# Patient Record
Sex: FEMALE | Race: WHITE | NOT HISPANIC OR LATINO | Employment: FULL TIME | URBAN - NONMETROPOLITAN AREA
[De-identification: names, ages, dates, MRNs, and addresses within clinical notes are randomized per-mention and may not be internally consistent; named-entity substitution may affect disease eponyms.]

---

## 2017-11-01 DIAGNOSIS — K22.4 ESOPHAGEAL SPASM: ICD-10-CM

## 2017-11-01 NOTE — TELEPHONE ENCOUNTER
Was the patient seen in the last year in this department? Yes     Does patient have an active prescription for medications requested? No     Received Request Via: Pharmacy      Pt met protocol?: No, OV 11/16   BP Readings from Last 1 Encounters:   11/29/16 120/78

## 2017-11-02 RX ORDER — DILTIAZEM HYDROCHLORIDE 120 MG/1
120 CAPSULE, COATED, EXTENDED RELEASE ORAL DAILY
Qty: 90 CAP | Refills: 0 | Status: SHIPPED | OUTPATIENT
Start: 2017-11-02 | End: 2018-02-13 | Stop reason: SDUPTHER

## 2017-11-29 ENCOUNTER — OFFICE VISIT (OUTPATIENT)
Dept: MEDICAL GROUP | Facility: PHYSICIAN GROUP | Age: 67
End: 2017-11-29
Payer: COMMERCIAL

## 2017-11-29 VITALS
SYSTOLIC BLOOD PRESSURE: 108 MMHG | BODY MASS INDEX: 34.16 KG/M2 | DIASTOLIC BLOOD PRESSURE: 76 MMHG | RESPIRATION RATE: 16 BRPM | TEMPERATURE: 98.4 F | HEIGHT: 65 IN | WEIGHT: 205 LBS | OXYGEN SATURATION: 98 % | HEART RATE: 98 BPM

## 2017-11-29 DIAGNOSIS — M25.561 CHRONIC PAIN OF RIGHT KNEE: ICD-10-CM

## 2017-11-29 DIAGNOSIS — G89.29 CHRONIC PAIN OF RIGHT KNEE: ICD-10-CM

## 2017-11-29 DIAGNOSIS — K21.9 GASTROESOPHAGEAL REFLUX DISEASE WITHOUT ESOPHAGITIS: ICD-10-CM

## 2017-11-29 DIAGNOSIS — M85.80 OSTEOPENIA, UNSPECIFIED LOCATION: ICD-10-CM

## 2017-11-29 PROCEDURE — 99214 OFFICE O/P EST MOD 30 MIN: CPT | Performed by: NURSE PRACTITIONER

## 2017-11-29 ASSESSMENT — PATIENT HEALTH QUESTIONNAIRE - PHQ9: CLINICAL INTERPRETATION OF PHQ2 SCORE: 0

## 2017-11-29 NOTE — LETTER
November 29, 2017        HARVEY Hoover  [address] [place] [address]      To whom it may concern:    Patient is recommended to have Ergonomic Adjustable work station as it is medically necessary for chronic knee pain     If you have any questions or concerns, please don't hesitate to call.        Sincerely,        SUNSHINE Rincon.    Electronically Signed

## 2017-11-29 NOTE — ASSESSMENT & PLAN NOTE
Patient identified as having a small hernia. Patient had Endoscopy. Patient is not on any medications. She managed with diet and portions.

## 2017-11-29 NOTE — ASSESSMENT & PLAN NOTE
Patient has OA of right knee. MRI and saw ortho and was told to take anti-inflammatory. She sits at her work, and requesting letter of support for ergonomic adjustment for work station.  Patient requests referral to ortho to discuss further treatment options.

## 2017-11-29 NOTE — PROGRESS NOTES
Chief Complaint   Patient presents with   • Annual Exam     Rt knee pain, sore, unable to put pressure       HISTORY OF PRESENT ILLNESS: Patient is a @ age 67 here  today to discuss:     Interval history:     Hospitalizations : YES - had Hernia repair and TIF for severe GERD    Injuries : chronic knee pain     Illness  No     Gastroesophageal reflux disease without esophagitis  Patient identified as having a small hernia and GERD identified with Endoscopy. Patient had hernia repair and TIF . She is doing very well.  Patient is not on any medications. She managed with diet and portions.     Osteopenia  Patient is requesting a repeat Bone Density. Patient takes vitamin D and Calcium. Patient is off Actonel for drug holiday rest.     Chronic pain of right knee  Patient has OA of right knee. MRI and saw ortho and was told to take anti-inflammatory. She continues to have problems with her right knee. Very painful.  She sits at her work, and requesting letter of support for ergonomic adjustment for work station.  Patient requests referral to ortho to discuss further treatment options.     Review of Systems   Constitutional: Negative for fever, chills, weight loss and malaise/fatigue.   HENT: Negative for ear pain, nosebleeds, congestion, sore throat and neck pain.    Eyes: Negative for blurred vision.   Respiratory: Negative for cough, sputum production, shortness of breath and wheezing.    Cardiovascular: Negative for chest pain, palpitations, orthopnea and leg swelling.   Gastrointestinal: SMALL HEARTBURN.   Genitourinary: Negative for dysuria, urgency and frequency.   Musculoskeletal:POSITIVE FOR RIGHT KNEE PAIN  Skin: Negative for rash and itching.   Neurological: Negative for dizziness, tingling, tremors, sensory change, focal weakness and headaches.   Endo/Heme/Allergies: Does not bruise/bleed easily.   Psychiatric/Behavioral: Negative for depression, anxiety, or memory loss.       Allergies:Patient has no known  "allergies.    Current Outpatient Prescriptions Ordered in [facility]   Medication Sig Dispense Refill   • diltiazem CD (CARDIZEM CD) 120 MG CAPSULE SR 24 HR Take 1 Cap by mouth every day. 90 Cap 0   • Omega-3 Fatty Acids (OMEGA 3 PO) Take  by mouth.     • Glucosamine-Chondroit-Vit C-Mn (GLUCOSAMINE 1500 COMPLEX PO) Take  by mouth.     • Multiple Vitamin (MULTIVITAMIN PO) Take  by mouth every day.     • Cholecalciferol (VITAMIN D) 2000 UNIT TABS Take 2,500 Units by mouth every day.     • omeprazole (PRILOSEC) 20 MG delayed-release capsule Take 1 Cap by mouth every day. 30 Cap 11   • fluticasone (FLONASE) 50 MCG/ACT nasal spray Spray 1 Spray in nose 2 times a day. Each Nostril 1 Bottle 0   • Pseudoephedrine HCl (SUDAFED PO) Take  by mouth as needed.     • ibuprofen (MOTRIN) 200 MG TABS Take 200 mg by mouth every 6 hours as needed.     • Risedronate Sodium (ACTONEL PO) Take 1.5 mg by mouth every 28 days.       No current [facility]-ordered facility-administered medications on file.        Past Medical History:   Diagnosis Date   • Alcoholism (CMS-[facility])    • OSTEOPOROSIS    • Recurrent sinusitis    • Sleep apnea     moderate.  use dental appliance   • Snoring     \"mild to moderate\"   • UTI (urinary tract infection)     2011       Social History   Substance Use Topics   • Smoking status: Former Smoker     Packs/day: 0.50     Years: 10.00     Types: Cigarettes     Quit date: 1990   • Smokeless tobacco: Never Used      Comment: non smoker   • Alcohol use No      Comment: Sober since ; active member in  in [place]       Family Status   Relation Status   • Mother    • Father      Family History   Problem Relation Age of Onset   • Non-contributory Mother    • Non-contributory Father        ROS: As documented in my HPI      Exam:  Blood pressure 108/76, pulse 98, temperature 36.9 °C (98.4 °F), resp. rate 16, height 1.651 m (5' 5\"), weight 93 kg (205 lb), SpO2 98 %.  General:  Well nourished, well developed " female in NAD  Head: Nontender scalp. No lesions  Neck: Supple. Symmetric Thyroid palpated. No bruits  HEENT: Eyes conjunctiva is clear, lids without ptosis, pupils equal round and reactive to light and accommodation.  Ears normal shape and contour, canals are clear bilaterally, TMs with good light reflex and appear normal.  Nasal mucosa pale and edematous with clear rhinorrhea.  Oropharynx benign.  Sinuses (frontal and maxillary) nontender to palpation.  Pulmonary:  Normal effort. No rales, ronchi, or wheezing.  Cardiovascular: Regular rate and rhythm without murmur.   Abdomen: Soft nontender, normal bowel sounds  Extremities: no clubbing, cyanosis, or edema.  Psych: Alert and oriented x3.  Neurological: No focal deficits    Please note that this dictation was created using voice recognition software. I have made every reasonable attempt to correct obvious errors, but I expect that there are errors of grammar and possibly content that I did not discover before finalizing the note.    Assessment/Plan:  1. Gastroesophageal reflux disease without esophagitis  COMP METABOLIC PANEL    LIPID PROFILE    VITAMIN D,25 HYDROXY    TSH+FREE T4   2. Osteopenia, unspecified location  DS-BONE DENSITY STUDY (DEXA)    COMP METABOLIC PANEL    VITAMIN D,25 HYDROXY   3. Chronic pain of right knee  REFERRAL TO ORTHOPEDICS        Will notify of labs results.

## 2017-11-29 NOTE — ASSESSMENT & PLAN NOTE
Patient is requesting a repeat Bone Density. Patient takes vitamin D and Calcium. Patient is off Actonel.

## 2017-12-26 ENCOUNTER — HOSPITAL ENCOUNTER (OUTPATIENT)
Dept: LAB | Facility: MEDICAL CENTER | Age: 67
End: 2017-12-26
Attending: NURSE PRACTITIONER
Payer: COMMERCIAL

## 2017-12-26 DIAGNOSIS — K21.9 GASTROESOPHAGEAL REFLUX DISEASE WITHOUT ESOPHAGITIS: ICD-10-CM

## 2017-12-26 DIAGNOSIS — M85.80 OSTEOPENIA, UNSPECIFIED LOCATION: ICD-10-CM

## 2017-12-26 LAB
25(OH)D3 SERPL-MCNC: 37 NG/ML (ref 30–100)
ALBUMIN SERPL BCP-MCNC: 3.9 G/DL (ref 3.2–4.9)
ALBUMIN/GLOB SERPL: 1.4 G/DL
ALP SERPL-CCNC: 69 U/L (ref 30–99)
ALT SERPL-CCNC: 19 U/L (ref 2–50)
ANION GAP SERPL CALC-SCNC: 7 MMOL/L (ref 0–11.9)
AST SERPL-CCNC: 20 U/L (ref 12–45)
BILIRUB SERPL-MCNC: 0.7 MG/DL (ref 0.1–1.5)
BUN SERPL-MCNC: 13 MG/DL (ref 8–22)
CALCIUM SERPL-MCNC: 9.1 MG/DL (ref 8.5–10.5)
CHLORIDE SERPL-SCNC: 109 MMOL/L (ref 96–112)
CHOLEST SERPL-MCNC: 210 MG/DL (ref 100–199)
CO2 SERPL-SCNC: 26 MMOL/L (ref 20–33)
CREAT SERPL-MCNC: 1.02 MG/DL (ref 0.5–1.4)
GFR SERPL CREATININE-BSD FRML MDRD: 54 ML/MIN/1.73 M 2
GLOBULIN SER CALC-MCNC: 2.7 G/DL (ref 1.9–3.5)
GLUCOSE SERPL-MCNC: 94 MG/DL (ref 65–99)
HDLC SERPL-MCNC: 65 MG/DL
LDLC SERPL CALC-MCNC: 128 MG/DL
POTASSIUM SERPL-SCNC: 4.1 MMOL/L (ref 3.6–5.5)
PROT SERPL-MCNC: 6.6 G/DL (ref 6–8.2)
SODIUM SERPL-SCNC: 142 MMOL/L (ref 135–145)
T4 FREE SERPL-MCNC: 0.83 NG/DL (ref 0.53–1.43)
TRIGL SERPL-MCNC: 87 MG/DL (ref 0–149)
TSH SERPL DL<=0.005 MIU/L-ACNC: 3.47 UIU/ML (ref 0.38–5.33)

## 2017-12-26 PROCEDURE — 80061 LIPID PANEL: CPT

## 2017-12-26 PROCEDURE — 80053 COMPREHEN METABOLIC PANEL: CPT

## 2017-12-26 PROCEDURE — 36415 COLL VENOUS BLD VENIPUNCTURE: CPT

## 2017-12-26 PROCEDURE — 84443 ASSAY THYROID STIM HORMONE: CPT

## 2017-12-26 PROCEDURE — 82306 VITAMIN D 25 HYDROXY: CPT

## 2017-12-26 PROCEDURE — 84439 ASSAY OF FREE THYROXINE: CPT

## 2018-01-17 ENCOUNTER — OFFICE VISIT (OUTPATIENT)
Dept: URGENT CARE | Facility: PHYSICIAN GROUP | Age: 68
End: 2018-01-17
Payer: COMMERCIAL

## 2018-01-17 VITALS
HEIGHT: 65 IN | SYSTOLIC BLOOD PRESSURE: 124 MMHG | DIASTOLIC BLOOD PRESSURE: 80 MMHG | TEMPERATURE: 98.7 F | WEIGHT: 207 LBS | RESPIRATION RATE: 16 BRPM | OXYGEN SATURATION: 94 % | HEART RATE: 80 BPM | BODY MASS INDEX: 34.49 KG/M2

## 2018-01-17 DIAGNOSIS — J22 ACUTE RESPIRATORY INFECTION: ICD-10-CM

## 2018-01-17 DIAGNOSIS — E66.9 OBESITY (BMI 30-39.9): ICD-10-CM

## 2018-01-17 PROCEDURE — 99212 OFFICE O/P EST SF 10 MIN: CPT | Performed by: FAMILY MEDICINE

## 2018-01-17 RX ORDER — AMOXICILLIN AND CLAVULANATE POTASSIUM 875; 125 MG/1; MG/1
TABLET, FILM COATED ORAL
Qty: 20 TAB | Refills: 0 | Status: CANCELLED | OUTPATIENT
Start: 2018-01-17

## 2018-01-17 NOTE — LETTER
January 17, 2018         Patient: HARVEY Hoover   YOB: 1950   Date of Visit: 1/17/2018           To Whom it May Concern:    HARVEY Hoover was seen in my clinic on 1/17/2018.    Please excuse from work for 1/15 thru and including 1/17/18 due to medical condition.    May return to regular work duty on 1/18/18.    If you have any questions or concerns, please don't hesitate to call.        Sincerely,           Kirill Hurley M.D.  Electronically Signed

## 2018-01-18 NOTE — PROGRESS NOTES
"Chief Complaint:    Chief Complaint   Patient presents with   • Nasal Congestion       History of Present Illness:    This is a new problem. On 1/13/18, she started with URI type of symptoms. Everything is getting better, nothing is getting worse, but now needs work note. If she did not need work note, she would not be here today. Only mild nasal symptoms and mild cough remain. No fever in past 2 days.      Review of Systems:    Constitutional: Negative for fever, chills, and diaphoresis.   Eyes: Negative for change in vision, photophobia, pain, redness, and discharge.  ENT: See HPI.  Respiratory: See HPI.  Cardiovascular: Negative for chest pain, palpitations, orthopnea, claudication, leg swelling, and PND.   Gastrointestinal: Negative for abdominal pain, nausea, vomiting, diarrhea, constipation, blood in stool, and melena.   Genitourinary: Negative for dysuria, urinary urgency, urinary frequency, hematuria, and flank pain.   Musculoskeletal: Negative for myalgias, joint pain, neck pain, and back pain.   Skin: Negative for rash and itching.   Neurological: Negative for dizziness, tingling, tremors, sensory change, speech change, focal weakness, seizures, loss of consciousness, and headaches.   Endo: Negative for polydipsia.   Heme: Does not bruise/bleed easily.   Psychiatric/Behavioral: No new symptoms.    Past Medical History:    Past Medical History:   Diagnosis Date   • Alcoholism (CMS-HCC)    • OSTEOPOROSIS    • Recurrent sinusitis    • Sleep apnea     moderate.  use dental appliance   • Snoring     \"mild to moderate\"   • UTI (urinary tract infection)     9/2011       Past Surgical History:    Past Surgical History:   Procedure Laterality Date   • HYSTERECTOMY ROBOTIC  2/23/2012    Performed by ALEXY ANDRADE at SURGERY [facility]S   • BIOPSY GENERAL  2/23/2012    Performed by ALEXY ANDRADE at SURGERY [facility]   • ABDOMINAL HYSTERECTOMY TOTAL      total    • CHOLECYSTECTOMY     • DILATION AND CURETTAGE  " "   • SINUSOTOMIES         Social History:    Social History     Social History   • Marital status:      Spouse name: N/A   • Number of children: N/A   • Years of education: N/A     Occupational History   • Not on file.     Social History Main Topics   • Smoking status: Former Smoker     Packs/day: 0.50     Years: 10.00     Types: Cigarettes     Quit date: 1/1/1990   • Smokeless tobacco: Never Used      Comment: non smoker   • Alcohol use No      Comment: Sober since 1993; active member in  in [place]   • Drug use: No   • Sexual activity: Yes     Partners: Male     Other Topics Concern   • Not on file     Social History Narrative   • No narrative on file       Family History:    Family History   Problem Relation Age of Onset   • Non-contributory Mother    • Non-contributory Father        Medications:    Current Outpatient Prescriptions on File Prior to Visit   Medication Sig Dispense Refill   • diltiazem CD (CARDIZEM CD) 120 MG CAPSULE SR 24 HR Take 1 Cap by mouth every day. 90 Cap 0   • omeprazole (PRILOSEC) 20 MG delayed-release capsule Take 1 Cap by mouth every day. 30 Cap 11   • Omega-3 Fatty Acids (OMEGA 3 PO) Take  by mouth.     • Glucosamine-Chondroit-Vit C-Mn (GLUCOSAMINE 1500 COMPLEX PO) Take  by mouth.     • fluticasone (FLONASE) 50 MCG/ACT nasal spray Spray 1 Spray in nose 2 times a day. Each Nostril 1 Bottle 0   • Multiple Vitamin (MULTIVITAMIN PO) Take  by mouth every day.     • Cholecalciferol (VITAMIN D) 2000 UNIT TABS Take 2,500 Units by mouth every day.     • Risedronate Sodium (ACTONEL PO) Take 1.5 mg by mouth every 28 days.       No current facility-administered medications on file prior to visit.        Allergies:    No Known Allergies      Vitals:    Vitals:    01/17/18 1633   BP: 124/80   Pulse: 80   Resp: 16   Temp: 37.1 °C (98.7 °F)   SpO2: 94%   Weight: 93.9 kg (207 lb)   Height: 1.651 m (5' 5\")       Physical Exam:    Constitutional: Vital signs reviewed. Appears well-developed and " well-nourished. No acute distress.   Eyes: Sclera white, conjunctivae clear.   ENT: External ears normal. Hearing normal. Nasal mucosa pink. Lips/teeth are normal. Oral mucosa pink and moist. Posterior pharynx: WNL.  Neck: Neck supple.   Cardiovascular: Regular rate and rhythm. No murmur.  Pulmonary/Chest: Respirations non-labored. Clear to auscultation bilaterally.  Lymph: Cervical nodes without tenderness or enlargement.      Assessment / Plan:    1. Acute respiratory infection      Work note given - excuse for 1/15 thru and including 1/17/18. May return to regular work duty on 1/18/18.    Discussed with her DDX and management options.    Follow-up with PCP or urgent care if getting worse or not better with time.

## 2018-02-13 DIAGNOSIS — K22.4 ESOPHAGEAL SPASM: ICD-10-CM

## 2018-02-13 NOTE — TELEPHONE ENCOUNTER
Was the patient seen in the last year in this department? Yes     Does patient have an active prescription for medications requested? No     Received Request Via: Pharmacy    Pt met protocol?: Yes     Last OV 11/2017  BP Readings from Last 1 Encounters:   01/17/18 124/80

## 2018-02-14 RX ORDER — DILTIAZEM HYDROCHLORIDE 120 MG/1
CAPSULE, EXTENDED RELEASE ORAL
Refills: 0 | OUTPATIENT
Start: 2018-02-14

## 2018-02-14 RX ORDER — DILTIAZEM HYDROCHLORIDE 120 MG/1
CAPSULE, EXTENDED RELEASE ORAL
Qty: 90 CAP | Refills: 1 | Status: SHIPPED | OUTPATIENT
Start: 2018-02-14 | End: 2019-02-15

## 2018-02-14 NOTE — TELEPHONE ENCOUNTER
Refill X 6 months, sent to pharmacy.Pt. Seen in the last 6 months per protocol.   Lab Results   Component Value Date/Time    SODIUM 142 12/26/2017 07:07 AM    POTASSIUM 4.1 12/26/2017 07:07 AM    CHLORIDE 109 12/26/2017 07:07 AM    CO2 26 12/26/2017 07:07 AM    GLUCOSE 94 12/26/2017 07:07 AM    BUN 13 12/26/2017 07:07 AM    CREATININE 1.02 12/26/2017 07:07 AM

## 2018-05-04 ENCOUNTER — OFFICE VISIT (OUTPATIENT)
Dept: URGENT CARE | Facility: PHYSICIAN GROUP | Age: 68
End: 2018-05-04
Payer: COMMERCIAL

## 2018-05-04 VITALS
HEIGHT: 65 IN | BODY MASS INDEX: 30.99 KG/M2 | DIASTOLIC BLOOD PRESSURE: 82 MMHG | RESPIRATION RATE: 16 BRPM | TEMPERATURE: 97.5 F | SYSTOLIC BLOOD PRESSURE: 124 MMHG | OXYGEN SATURATION: 96 % | HEART RATE: 74 BPM | WEIGHT: 186 LBS

## 2018-05-04 DIAGNOSIS — H69.93 ETD (EUSTACHIAN TUBE DYSFUNCTION), BILATERAL: ICD-10-CM

## 2018-05-04 DIAGNOSIS — J01.00 ACUTE MAXILLARY SINUSITIS, RECURRENCE NOT SPECIFIED: ICD-10-CM

## 2018-05-04 PROCEDURE — 99213 OFFICE O/P EST LOW 20 MIN: CPT | Performed by: NURSE PRACTITIONER

## 2018-05-04 RX ORDER — ONDANSETRON 4 MG/1
4 TABLET, ORALLY DISINTEGRATING ORAL ONCE
Status: COMPLETED | OUTPATIENT
Start: 2018-05-04 | End: 2018-05-04

## 2018-05-04 RX ADMIN — ONDANSETRON 4 MG: 4 TABLET, ORALLY DISINTEGRATING ORAL at 10:48

## 2018-05-04 ASSESSMENT — ENCOUNTER SYMPTOMS
DIZZINESS: 1
GASTROINTESTINAL NEGATIVE: 1
EYES NEGATIVE: 1
SORE THROAT: 0
SINUS PAIN: 1
RESPIRATORY NEGATIVE: 1
CARDIOVASCULAR NEGATIVE: 1
CONSTITUTIONAL NEGATIVE: 1

## 2018-05-04 NOTE — PROGRESS NOTES
Subjective:      H Anna Hoover is a 68 y.o. female who presents with Sinus Problem (Earache )            HPI   Pt c/o sinus congestion and earaches  Also c/o dizziness when moves head  Hx of sinus issues  Hx of sinus surgery  Used saline rinse and humidifier  No ST    clear nasal discharge  B/l facial tenderness more in right side    Mild ear pain  No fever  No nausea no vomiting  No falls  No teeth pain  Drinking enough  water    PMH:  has a past medical history of Alcoholism (CMS-[facility]) (HCC); OSTEOPOROSIS; Recurrent sinusitis; Sleep apnea; Snoring; and UTI (urinary tract infection). She also has no past medical history of Diabetes.  MEDS:   Current Outpatient Prescriptions:   •  MethylPREDNISolone (MEDROL DOSEPAK) 4 MG Tablet Therapy Pack, Take 1 Tab by mouth every day., Disp: 1 Kit, Rfl: 0  •  CARTIA  MG CAPSULE SR 24 HR, TAKE 1 CAPSULE BY MOUTH EVERY DAY, Disp: 90 Cap, Rfl: 1  •  omeprazole (PRILOSEC) 20 MG delayed-release capsule, Take 1 Cap by mouth every day., Disp: 30 Cap, Rfl: 11  •  Omega-3 Fatty Acids (OMEGA 3 PO), Take  by mouth., Disp: , Rfl:   •  Glucosamine-Chondroit-Vit C-Mn (GLUCOSAMINE 1500 COMPLEX PO), Take  by mouth., Disp: , Rfl:   •  fluticasone (FLONASE) 50 MCG/ACT nasal spray, Spray 1 Spray in nose 2 times a day. Each Nostril, Disp: 1 Bottle, Rfl: 0  •  Multiple Vitamin (MULTIVITAMIN PO), Take  by mouth every day., Disp: , Rfl:   •  Cholecalciferol (VITAMIN D) 2000 UNIT TABS, Take 2,500 Units by mouth every day., Disp: , Rfl:   •  Risedronate Sodium (ACTONEL PO), Take 1.5 mg by mouth every 28 days., Disp: , Rfl:   ALLERGIES: No Known Allergies  SURGHX:   Past Surgical History:   Procedure Laterality Date   • HYSTERECTOMY ROBOTIC  2/23/2012    Performed by ALEXY ANDRADE at SURGERY [facility]S   • BIOPSY GENERAL  2/23/2012    Performed by ALEXY ANDRADE at SURGERY [facility] ORS   • ABDOMINAL HYSTERECTOMY TOTAL      total    • CHOLECYSTECTOMY     • DILATION AND CURETTAGE     •  "SINUSOTOMIES       SOCHX:  reports that she quit smoking about 28 years ago. Her smoking use included Cigarettes. She has a 5.00 pack-year smoking history. She has never used smokeless tobacco. She reports that she does not drink alcohol or use drugs.  FH: family history includes Non-contributory in her father and mother.    Review of Systems   Constitutional: Negative.    HENT: Positive for congestion, ear pain and sinus pain. Negative for ear discharge and sore throat.    Eyes: Negative.    Respiratory: Negative.    Cardiovascular: Negative.    Gastrointestinal: Negative.    Genitourinary: Negative.    Skin: Negative.    Neurological: Positive for dizziness.          Objective:     /82   Pulse 74   Temp 36.4 °C (97.5 °F)   Resp 16   Ht 1.651 m (5' 5\")   Wt 84.4 kg (186 lb)   SpO2 96%   BMI 30.95 kg/m²      Physical Exam   Constitutional: She is oriented to person, place, and time. She appears well-developed and well-nourished.   HENT:   Head: Normocephalic and atraumatic.   Eyes: Conjunctivae and EOM are normal. Pupils are equal, round, and reactive to light.   Neck: Normal range of motion. Neck supple.   Cardiovascular: Normal rate, regular rhythm and normal heart sounds.    Pulmonary/Chest: Effort normal.   Musculoskeletal: Normal range of motion.   Neurological: She is alert and oriented to person, place, and time.   Skin: Skin is warm and dry. Capillary refill takes less than 2 seconds.   Psychiatric: She has a normal mood and affect. Her behavior is normal. Judgment and thought content normal.               Assessment/Plan:     1. ETD (Eustachian tube dysfunction), bilateral     2. Acute maxillary sinusitis, recurrence not specified  MethylPREDNISolone (MEDROL DOSEPAK) 4 MG Tablet Therapy Pack     Handout given to patient on both eustachian tube dysfunction and sinusitis  Start with saline nasal rinse and nasal steroids such as Flonase  Can add combo decongestant antihistamine  Start Medrol " Dosepak  If sinus symptoms fail to improve patient will then need an antibiotic  Discussed holding off on antibiotics today patient is in agreement  Monitor for worsening persistent symptoms and follow-up sooner

## 2018-05-04 NOTE — PATIENT INSTRUCTIONS
Sinusitis, Adult  Sinusitis is soreness and inflammation of your sinuses. Sinuses are hollow spaces in the bones around your face. They are located:  · Around your eyes.  · In the middle of your forehead.  · Behind your nose.  · In your cheekbones.  Your sinuses and nasal passages are lined with a stringy fluid (mucus). Mucus normally drains out of your sinuses. When your nasal tissues get inflamed or swollen, the mucus can get trapped or blocked so air cannot flow through your sinuses. This lets bacteria, viruses, and funguses grow, and that leads to infection.  Follow these instructions at home:  Medicines  · Take, use, or apply over-the-counter and prescription medicines only as told by your doctor. These may include nasal sprays.  · If you were prescribed an antibiotic medicine, take it as told by your doctor. Do not stop taking the antibiotic even if you start to feel better.  Hydrate and Humidify  · Drink enough water to keep your pee (urine) clear or pale yellow.  · Use a cool mist humidifier to keep the humidity level in your home above 50%.  · Breathe in steam for 10-15 minutes, 3-4 times a day or as told by your doctor. You can do this in the bathroom while a hot shower is running.  · Try not to spend time in cool or dry air.  Rest  · Rest as much as possible.  · Sleep with your head raised (elevated).  · Make sure to get enough sleep each night.  General instructions  · Put a warm, moist washcloth on your face 3-4 times a day or as told by your doctor. This will help with discomfort.  · Wash your hands often with soap and water. If there is no soap and water, use hand .  · Do not smoke. Avoid being around people who are smoking (secondhand smoke).  · Keep all follow-up visits as told by your doctor. This is important.  Contact a doctor if:  · You have a fever.  · Your symptoms get worse.  · Your symptoms do not get better within 10 days.  Get help right away if:  · You have a very bad  headache.  · You cannot stop throwing up (vomiting).  · You have pain or swelling around your face or eyes.  · You have trouble seeing.  · You feel confused.  · Your neck is stiff.  · You have trouble breathing.  This information is not intended to replace advice given to you by your health care provider. Make sure you discuss any questions you have with your health care provider.  Document Released: 06/05/2009 Document Revised: 08/13/2017 Document Reviewed: 10/12/2016  [employer] Interactive Patient Education © 2017 [employer]      Eustachian Tube Dysfunction  Introduction  The eustachian tube connects the middle ear to the back of the nose. It regulates air pressure in the middle ear by allowing air to move between the ear and nose. It also helps to drain fluid from the middle ear space. When the eustachian tube does not function properly, air pressure, fluid, or both can build up in the middle ear.  Eustachian tube dysfunction can affect one or both ears.  What are the causes?  This condition happens when the eustachian tube becomes blocked or cannot open normally. This may result from:  · Ear infections.  · Colds and other upper respiratory infections.  · Allergies.  · Irritation, such as from cigarette smoke or acid from the stomach coming up into the esophagus (gastroesophageal reflux).  · Sudden changes in air pressure, such as from descending in an airplane.  · Abnormal growths in the nose or throat, such as nasal polyps, tumors, or enlarged tissue at the back of the throat (adenoids).  What increases the risk?  This condition may be more likely to develop in people who smoke and people who are overweight. Eustachian tube dysfunction may also be more likely to develop in children, especially children who have:  · Certain birth defects of the mouth, such as cleft palate.  · Large tonsils and adenoids.  What are the signs or symptoms?  Symptoms of this condition may include:  · A feeling of fullness in the  "ear.  · Ear pain.  · Clicking or popping noises in the ear.  · Ringing in the ear.  · Hearing loss.  · Loss of balance.  Symptoms may get worse when the air pressure around you changes, such as when you travel to an area of high elevation or fly on an airplane.  How is this diagnosed?  This condition may be diagnosed based on:  · Your symptoms.  · A physical exam of your ear, nose, and throat.  · Tests, such as those that measure:  ¨ The movement of your eardrum (tympanogram).  ¨ Your hearing (audiometry).  How is this treated?  Treatment depends on the cause and severity of your condition. If your symptoms are mild, you may be able to relieve your symptoms by moving air into (\"popping\") your ears. If you have symptoms of fluid in your ears, treatment may include:  · Decongestants.  · Antihistamines.  · Nasal sprays or ear drops that contain medicines that reduce swelling (steroids).  In some cases, you may need to have a procedure to drain the fluid in your eardrum (myringotomy). In this procedure, a small tube is placed in the eardrum to:  · Drain the fluid.  · Restore the air in the middle ear space.  Follow these instructions at home:  · Take over-the-counter and prescription medicines only as told by your health care provider.  · Use techniques to help pop your ears as recommended by your health care provider. These may include:  ¨ Chewing gum.  ¨ Yawning.  ¨ Frequent, forceful swallowing.  ¨ Closing your mouth, holding your nose closed, and gently blowing as if you are trying to blow air out of your nose.  · Do not do any of the following until your health care provider approves:  ¨ Travel to high altitudes.  ¨ Fly in airplanes.  ¨ Work in a pressurized cabin or room.  ¨ Scuba dive.  · Keep your ears dry. Dry your ears completely after showering or bathing.  · Do not smoke.  · Keep all follow-up visits as told by your health care provider. This is important.  Contact a health care provider if:  · Your symptoms " do not go away after treatment.  · Your symptoms come back after treatment.  · You are unable to pop your ears.  · You have:  ¨ A fever.  ¨ Pain in your ear.  ¨ Pain in your head or neck.  ¨ Fluid draining from your ear.  · Your hearing suddenly changes.  · You become very dizzy.  · You lose your balance.  This information is not intended to replace advice given to you by your health care provider. Make sure you discuss any questions you have with your health care provider.  Document Released: 01/13/2017 Document Revised: 05/25/2017 Document Reviewed: 01/06/2016  © 2017 [employer]

## 2018-05-04 NOTE — NON-PROVIDER
Medication (Zofran 4mg ODT) and Lab (POCT Urinalysis) were ordered and documented on this patient incorrectly.  This medication was not administered and a urine specimen was not collected on this patient.  Provider aware of situation and was unable to reverse/cancel orders.  IT contacted to have corrections made.  Practice supervisor notified of situation.

## 2018-11-07 NOTE — PROGRESS NOTES
Order(s) created erroneously. Erroneous order ID: [id number]   Order moved by: CLARENCE RETANA   Order move date/time: 11/07/2018 11:41 AM   Source Patient: [id number]   Source Contact: 05/04/2018   Destination Patient: [id number]   Destination Contact: 02/08/2016

## 2019-02-15 ENCOUNTER — OFFICE VISIT (OUTPATIENT)
Dept: URGENT CARE | Facility: PHYSICIAN GROUP | Age: 69
End: 2019-02-15
Payer: COMMERCIAL

## 2019-02-15 ENCOUNTER — HOSPITAL ENCOUNTER (OUTPATIENT)
Facility: MEDICAL CENTER | Age: 69
End: 2019-02-15
Attending: PHYSICIAN ASSISTANT
Payer: COMMERCIAL

## 2019-02-15 VITALS
TEMPERATURE: 97.5 F | SYSTOLIC BLOOD PRESSURE: 106 MMHG | BODY MASS INDEX: 30.45 KG/M2 | DIASTOLIC BLOOD PRESSURE: 68 MMHG | RESPIRATION RATE: 16 BRPM | OXYGEN SATURATION: 97 % | WEIGHT: 183 LBS | HEART RATE: 65 BPM

## 2019-02-15 DIAGNOSIS — R35.0 URINE FREQUENCY: ICD-10-CM

## 2019-02-15 DIAGNOSIS — N30.01 ACUTE CYSTITIS WITH HEMATURIA: ICD-10-CM

## 2019-02-15 DIAGNOSIS — N30.01 ACUTE CYSTITIS WITH HEMATURIA: Primary | ICD-10-CM

## 2019-02-15 LAB
APPEARANCE UR: CLEAR
BILIRUB UR STRIP-MCNC: NORMAL MG/DL
COLOR UR AUTO: YELLOW
GLUCOSE UR STRIP.AUTO-MCNC: NORMAL MG/DL
KETONES UR STRIP.AUTO-MCNC: 15 MG/DL
LEUKOCYTE ESTERASE UR QL STRIP.AUTO: NORMAL
NITRITE UR QL STRIP.AUTO: NORMAL
PH UR STRIP.AUTO: 6 [PH] (ref 5–8)
PROT UR QL STRIP: NORMAL MG/DL
RBC UR QL AUTO: NORMAL
SP GR UR STRIP.AUTO: <1.005
UROBILINOGEN UR STRIP-MCNC: 0.2 MG/DL

## 2019-02-15 PROCEDURE — 99214 OFFICE O/P EST MOD 30 MIN: CPT | Performed by: PHYSICIAN ASSISTANT

## 2019-02-15 PROCEDURE — 81002 URINALYSIS NONAUTO W/O SCOPE: CPT | Performed by: PHYSICIAN ASSISTANT

## 2019-02-15 PROCEDURE — 87086 URINE CULTURE/COLONY COUNT: CPT

## 2019-02-15 PROCEDURE — 87077 CULTURE AEROBIC IDENTIFY: CPT

## 2019-02-15 PROCEDURE — 87186 SC STD MICRODIL/AGAR DIL: CPT

## 2019-02-15 RX ORDER — NITROFURANTOIN 25; 75 MG/1; MG/1
100 CAPSULE ORAL EVERY 12 HOURS
Qty: 10 CAP | Refills: 0 | Status: SHIPPED | OUTPATIENT
Start: 2019-02-15 | End: 2019-02-20

## 2019-02-15 ASSESSMENT — ENCOUNTER SYMPTOMS
NAUSEA: 0
COUGH: 0
CONSTIPATION: 0
CHILLS: 0
DIARRHEA: 0
SHORTNESS OF BREATH: 0
FLANK PAIN: 0
ABDOMINAL PAIN: 0
FEVER: 0
VOMITING: 0

## 2019-02-15 NOTE — PROGRESS NOTES
Subjective:   HARVEY Hoover is a 68 y.o. female who presents for Urinary Frequency (x 2 days )          Dysuria    This is a new problem. Episode onset: 2 days. The problem has been unchanged. The quality of the pain is described as burning. There has been no fever. There is no history of pyelonephritis. Associated symptoms include frequency, hematuria, hesitancy and urgency. Pertinent negatives include no chills, discharge, flank pain, nausea or vomiting. Her past medical history is significant for recurrent UTIs. There is no history of kidney stones.       Review of Systems   Constitutional: Negative for chills, fever and malaise/fatigue.   Respiratory: Negative for cough and shortness of breath.    Gastrointestinal: Negative for abdominal pain, constipation, diarrhea, nausea and vomiting.   Genitourinary: Positive for dysuria, frequency, hematuria, hesitancy and urgency. Negative for flank pain.   All other systems reviewed and are negative.      PMH:  has a past medical history of Alcoholism (HCC); OSTEOPOROSIS; Recurrent sinusitis; Sleep apnea; Snoring; and UTI (urinary tract infection). She also has no past medical history of Diabetes.  MEDS:   Current Outpatient Prescriptions:   •  nitrofurantoin monohydr macro (MACROBID) 100 MG Cap, Take 1 Cap by mouth every 12 hours for 5 days., Disp: 10 Cap, Rfl: 0  •  Omega-3 Fatty Acids (OMEGA 3 PO), Take  by mouth., Disp: , Rfl:   •  Glucosamine-Chondroit-Vit C-Mn (GLUCOSAMINE 1500 COMPLEX PO), Take  by mouth., Disp: , Rfl:   •  Multiple Vitamin (MULTIVITAMIN PO), Take  by mouth every day., Disp: , Rfl:   •  Cholecalciferol (VITAMIN D) 2000 UNIT TABS, Take 2,500 Units by mouth every day., Disp: , Rfl:   •  Risedronate Sodium (ACTONEL PO), Take 1.5 mg by mouth every 28 days., Disp: , Rfl:   ALLERGIES: No Known Allergies  SURGHX:   Past Surgical History:   Procedure Laterality Date   • HYSTERECTOMY ROBOTIC  2/23/2012    Performed by ALEXY ANDRADE at [facility]  ORS   • BIOPSY GENERAL  2/23/2012    Performed by ALEXY ANDRADE at [facility] ORS   • ABDOMINAL HYSTERECTOMY TOTAL      total    • CHOLECYSTECTOMY     • DILATION AND CURETTAGE     • SINUSOTOMIES       SOCHX:  reports that she quit smoking about 29 years ago. Her smoking use included Cigarettes. She has a 5.00 pack-year smoking history. She has never used smokeless tobacco. She reports that she does not drink alcohol or use drugs.  Family History   Problem Relation Age of Onset   • Non-contributory Mother    • Non-contributory Father         Objective:   /68   Pulse 65   Temp 36.4 °C (97.5 °F) (Temporal)   Resp 16   Wt 83 kg (183 lb)   SpO2 97%   BMI 30.45 kg/m²     Physical Exam   Constitutional: She is oriented to person, place, and time. She appears well-developed and well-nourished. No distress.   HENT:   Head: Normocephalic and atraumatic.   Eyes: Pupils are equal, round, and reactive to light. Conjunctivae are normal.   Cardiovascular: Normal rate and regular rhythm.    Pulmonary/Chest: Effort normal and breath sounds normal.   Abdominal: There is no tenderness. There is no CVA tenderness.   Neurological: She is alert and oriented to person, place, and time.   Skin: Skin is warm and dry.   Psychiatric: She has a normal mood and affect. Her behavior is normal.   Vitals reviewed.        Lab Results   Component Value Date/Time    POCCOLOR Yellow 02/15/2019 12:12 PM    POCAPPEAR clear 02/15/2019 12:12 PM    POCLEUKEST Moderate 02/15/2019 12:12 PM    POCNITRITE NEg 02/15/2019 12:12 PM    POCUROBILIGE 0.2 02/15/2019 12:12 PM    POCPROTEIN Trace 02/15/2019 12:12 PM    POCURPH 6 02/15/2019 12:12 PM    POCBLOOD Large 02/15/2019 12:12 PM    POCSPGRV <1.005 02/15/2019 12:12 PM    POCKETONES 15 02/15/2019 12:12 PM    POCBILIRUBIN neg 02/15/2019 12:12 PM    POCGLUCUA neg 02/15/2019 12:12 PM      Assessment/Plan:     1. Acute cystitis with hematuria  Urine Culture    nitrofurantoin monohydr macro  (MACROBID) 100 MG Cap   2. Urine frequency  POCT Urinalysis       Pt will be notified if final urine cx results are not susceptible to antibiotic prescribed today.     Patient directed to take full course of abx. Supportive care measures reviewed, continue pushing fluids, probiotics/yogurt. UTI educational handout given to patient. If sx worsen or persist patient directed to return to clinic for reevaluation.     Follow-up with primary care provider within 7-10 days, red flags and emergency room precautions discussed.  Patient appears understanding of information.

## 2019-02-17 LAB
BACTERIA UR CULT: ABNORMAL
BACTERIA UR CULT: ABNORMAL
SIGNIFICANT IND 70042: ABNORMAL
SITE SITE: ABNORMAL
SOURCE SOURCE: ABNORMAL

## 2019-02-18 DIAGNOSIS — N30.01 ACUTE CYSTITIS WITH HEMATURIA: Primary | ICD-10-CM

## 2019-02-18 RX ORDER — CEFUROXIME AXETIL 250 MG/1
250 TABLET ORAL 2 TIMES DAILY
Qty: 14 TAB | Refills: 0 | Status: SHIPPED | OUTPATIENT
Start: 2019-02-18 | End: 2019-02-25

## 2019-09-20 ENCOUNTER — OFFICE VISIT (OUTPATIENT)
Dept: MEDICAL GROUP | Facility: CLINIC | Age: 69
End: 2019-09-20
Payer: COMMERCIAL

## 2019-09-20 ENCOUNTER — PATIENT MESSAGE (OUTPATIENT)
Dept: MEDICAL GROUP | Facility: CLINIC | Age: 69
End: 2019-09-20

## 2019-09-20 VITALS
BODY MASS INDEX: 28.16 KG/M2 | HEIGHT: 65 IN | HEART RATE: 68 BPM | RESPIRATION RATE: 16 BRPM | WEIGHT: 169 LBS | SYSTOLIC BLOOD PRESSURE: 112 MMHG | TEMPERATURE: 98.9 F | OXYGEN SATURATION: 97 % | DIASTOLIC BLOOD PRESSURE: 72 MMHG

## 2019-09-20 DIAGNOSIS — M85.80 OSTEOPENIA, UNSPECIFIED LOCATION: ICD-10-CM

## 2019-09-20 DIAGNOSIS — F10.11 ALCOHOL ABUSE, IN REMISSION: ICD-10-CM

## 2019-09-20 DIAGNOSIS — G47.30 SLEEP APNEA, UNSPECIFIED TYPE: ICD-10-CM

## 2019-09-20 DIAGNOSIS — K21.9 GASTROESOPHAGEAL REFLUX DISEASE WITHOUT ESOPHAGITIS: ICD-10-CM

## 2019-09-20 DIAGNOSIS — Z87.19 HISTORY OF ESOPHAGEAL SPASM: ICD-10-CM

## 2019-09-20 DIAGNOSIS — Z90.710 HISTORY OF HYSTERECTOMY: ICD-10-CM

## 2019-09-20 DIAGNOSIS — Z76.89 ESTABLISHING CARE WITH NEW DOCTOR, ENCOUNTER FOR: ICD-10-CM

## 2019-09-20 PROBLEM — E66.9 OBESITY (BMI 30-39.9): Status: RESOLVED | Noted: 2018-01-17 | Resolved: 2019-09-20

## 2019-09-20 PROCEDURE — 99203 OFFICE O/P NEW LOW 30 MIN: CPT | Performed by: NURSE PRACTITIONER

## 2019-09-20 ASSESSMENT — PAIN SCALES - GENERAL: PAINLEVEL: NO PAIN

## 2019-09-20 ASSESSMENT — PATIENT HEALTH QUESTIONNAIRE - PHQ9: CLINICAL INTERPRETATION OF PHQ2 SCORE: 0

## 2019-09-20 NOTE — ASSESSMENT & PLAN NOTE
Patient reports she was seen by her dentist, reports she was diagnosed with Sleep Apnea and does not have to use a CPAP since she uses a nightguard.

## 2019-09-20 NOTE — ASSESSMENT & PLAN NOTE
Patient reports she is followed by OBGYN Dr. Dave, reports in the past she was prescribed Actonel, d/c due to GERD. Reports most recent Bone screening was normal; Currently taking Vit D and Ca. She also reports she has been working on her weight training, she has lost some weight due to this new activity as well.

## 2019-09-20 NOTE — PATIENT INSTRUCTIONS
Your medical care was provided today by: RAHEL Munoz    Thank You for the opportunity to serve you.    You may receive a brief survey in the mail shortly regarding your visit today. Please take a few moments to complete the survey and return it; no postage is necessary. We are working to serve our patient population better, improve customer service and our patients overall experience and your input can help us to accomplish this. We thank you for your help and for the opportunity to serve you today and in the future.     Labs and Diagnostic Testing   Please note that if we have ordered labs or diagnostic testing, those results may be released to you on [employer] prior to my review. While we do our best to review your results as soon as possible, there are times when you may see your results prior to provider review. Of course, if you ever have any questions or concerns about your results, please contact our clinic.     Special Instructions:  Always call 9-1-1 immediately if you develop a life threatening emergency.    Unless told otherwise please take all medications as directed and complete prescription therapies.     Watch for the following signs that require additional evaluation: progressive lethargy or unresponsiveness, localized pain (chest, abdomen), shortness of breath, painful breathing, progressive vomiting with weakness, bloody stools, or new rash.     If you are prescribed pain medication or any other medication that is sedating, do not take medication before or while operating a vehicle or heavy machinery or equipment due to potential side effects such as drowsiness and/or dizziness.

## 2019-09-20 NOTE — PROGRESS NOTES
Chief Complaint   Patient presents with   • Annual Exam        H Anna Hoover is a 69 y.o. female here today to establish care and to discuss the evaluation and management of:    HPI:  Kwasi is here to est care, she has no concerns today; does not need any medication refills.     Osteopenia  Patient reports she is followed by OBGYN Dr. Dave, reports in the past she was prescribed Actonel, d/c due to GERD. Reports most recent Bone screening was normal; Currently taking Vit D and Ca. She also reports she has been working on her weight training, she has lost some weight due to this new activity as well.     Sleep apnea  Patient reports she was seen by her dentist, reports she was diagnosed with Sleep Apnea and does not have to use a CPAP since she uses a nightguard.     Alcohol abuse, in remission  Very active member of AA, sober since 1993     History of hysterectomy  Not due to cancer  No need for PAP     Gastroesophageal reflux disease without esophagitis  Patient reports she does not experience GERD much at all anymore since hernia repair and d/c of Actonel.       Current medicines (including changes today)  Current Outpatient Medications   Medication Sig Dispense Refill   • Omega-3 Fatty Acids (OMEGA 3 PO) Take  by mouth.     • Multiple Vitamin (MULTIVITAMIN PO) Take  by mouth every day.     • Cholecalciferol (VITAMIN D) 2000 UNIT TABS Take 2,500 Units by mouth every day.       No current facility-administered medications for this visit.        She  has a past medical history of Alcoholism ([facility]), GERD (gastroesophageal reflux disease), OSTEOPOROSIS, Recurrent sinusitis, Sleep apnea, Snoring, and UTI (urinary tract infection). She also has no past medical history of Diabetes.    She  has a past surgical history that includes cholecystectomy; sinusotomies; dilation and curettage; hysterectomy robotic (2/23/2012); biopsy general (2/23/2012); abdominal hysterectomy total; and hernia repair.     Social History      Tobacco Use   • Smoking status: Former Smoker     Packs/day: 0.50     Years: 10.00     Pack years: 5.00     Types: Cigarettes     Last attempt to quit: 1990     Years since quittin.7   • Smokeless tobacco: Never Used   • Tobacco comment: non smoker   Substance Use Topics   • Alcohol use: No     Alcohol/week: 0.0 oz     Comment: Sober since ; active member in AA in [place]   • Drug use: No       Social History     Social History Narrative   • Not on file       Family History   Problem Relation Age of Onset   • Non-contributory Mother    • Non-contributory Father        Family Status   Relation Name Status   • Mo     • Fa         ROS   Constitutional: Denies fever, chills, or sweats  Eyes: negative for visual blurring, double vision, eye pain, floaters and discharge from eyes  ENT: negative for tinnitus, vertigo, frequent URI's, sinus trouble, persistent sore throat  Respiratory: negative for persistent cough, hemoptysis, dyspnea, wheezing  Cardiovascular: negative for palpitations, chest pain, or peripheral edema.  Gastrointestinal: negative for poor appetite, dysphagia, nausea, heartburn, abdominal pain, hemorrhoids, constipation or diarrhea  Genitourinary: negative for dysuria. negative for abnormal uterine bleeding, vaginal discharge   Musculoskeletal: negative for joint swelling and muscle pain/ soreness  Skin: negative for rash, scaling, hair or nail changes.  Neurologic: negative for migraine headaches, involuntary movements or tremor  Psychiatric: negative for excessive alcohol consumption or illegal drug usage, sleep disturbance, anxiety, depression  Hematologic/Lymphatic/Immunologic: negative for unusual bruising, swollen glands  Endocrine: negative for temperature intolerance, polydipsia, polyuria, unintentional weight changes.        Objective:     /72 (BP Location: Right arm, Patient Position: Sitting, BP Cuff Size: Adult)   Pulse 68   Temp 37.2 °C (98.9 °F)  "(Temporal)   Resp 16   Ht 1.651 m (5' 5\")   Wt 76.7 kg (169 lb)   SpO2 97%  Body mass index is 28.12 kg/m².    Physical Exam:   Constitutional: Alert, no distress.  Eye: Equal, round and reactive, conjunctiva clear, lids normal.  ENMT: Lips without lesions, good dentition, sees a dentist, oropharynx clear. TM's normal without erythema, canals patent. Normal appearance of external nose, no drainage noted.   Neck: Trachea midline, no masses, no thyromegaly. No cervical or supraclavicular lymphadenopathy.   Respiratory: Unlabored respiratory effort, lungs clear to auscultation, no wheezes, no ronchi.  Cardiovascular: Normal S1, S2, no murmur, no edema. Capillary refill < 2 seconds in UE bilaterally.   Abdomen: Soft, non-tender, no masses, no hepatosplenomegaly. Normal bowel sounds.   Skin: Warm, dry, good turgor, no rashes in visible areas.  Neuro:  CN II-XII grossly intact, normal sensation in extremities.   Psych: Alert and oriented x3, normal affect and mood.      Assessment and Plan:   The following treatment plan was discussed    1. Establishing care with new doctor, encounter for    2. Osteopenia, unspecified location  Per patient stable, ordered fasting labs for her to complete. Encouraged to continue with weight bearing exercise, Vit D and Ca supplementation.   - TSH WITH REFLEX TO FT4; Future  - Lipid Profile; Future  - Comp Metabolic Panel; Future    3. History of esophageal spasm    4. Gastroesophageal reflux disease without esophagitis  Stable, appears diet controlled     5. Sleep apnea, unspecified type  Per patient, stable   - ESTIMATED GFR; Future    6. Alcohol abuse, in remission  Continue with active involvement in AA, continue sobriety.     7. History of hysterectomy  No need for PAP       Reviewed risks and benefits of treatment plan. Patient verbally agrees to plan of care.     Records requested.    Followup: Return in about 6 months (around 3/20/2020).    ANALI Langford.     PLEASE " NOTE: This dictation was created using voice recognition software. I have made every reasonable attempt to correct obvious errors, but I expect that there may be errors of grammar and possibly content that I did not discover prior finalizing this note.

## 2019-09-20 NOTE — ASSESSMENT & PLAN NOTE
Patient reports she does not experience GERD much at all anymore since hernia repair and d/c of Actonel.

## 2019-09-30 ENCOUNTER — PATIENT MESSAGE (OUTPATIENT)
Dept: MEDICAL GROUP | Facility: CLINIC | Age: 69
End: 2019-09-30

## 2019-10-08 NOTE — TELEPHONE ENCOUNTER
From: HARVEY Hoover  To: BRANDT Langford  Sent: 9/30/2019 12:53 PM PDT  Subject: Test Result Question    Did you receive my blood test?

## 2020-08-21 ENCOUNTER — HOSPITAL ENCOUNTER (OUTPATIENT)
Dept: LAB | Facility: MEDICAL CENTER | Age: 70
End: 2020-08-21
Attending: OBSTETRICS & GYNECOLOGY
Payer: COMMERCIAL

## 2020-08-21 PROCEDURE — 88175 CYTOPATH C/V AUTO FLUID REDO: CPT

## 2020-08-21 PROCEDURE — 87624 HPV HI-RISK TYP POOLED RSLT: CPT

## 2020-08-24 LAB
CYTOLOGY REG CYTOL: NORMAL
HPV HR 12 DNA CVX QL NAA+PROBE: NEGATIVE
HPV16 DNA SPEC QL NAA+PROBE: NEGATIVE
HPV18 DNA SPEC QL NAA+PROBE: NEGATIVE
SPECIMEN SOURCE: NORMAL

## 2024-01-30 ENCOUNTER — HOSPITAL ENCOUNTER (OUTPATIENT)
Facility: MEDICAL CENTER | Age: 74
End: 2024-01-30
Attending: OBSTETRICS & GYNECOLOGY
Payer: MEDICARE

## 2024-01-30 PROCEDURE — 87624 HPV HI-RISK TYP POOLED RSLT: CPT

## 2024-01-30 PROCEDURE — 88175 CYTOPATH C/V AUTO FLUID REDO: CPT

## 2024-02-02 LAB
CYTOLOGIST CVX/VAG CYTO: NORMAL
CYTOLOGY CVX/VAG DOC CYTO: NORMAL
CYTOLOGY CVX/VAG DOC THIN PREP: NORMAL
DX ICD CODE: NORMAL
HPV I/H RISK 4 DNA CVX QL PROBE+SIG AMP: NEGATIVE
NOTE NL11727A: NORMAL
OTHER STN SPEC: NORMAL
STAT OF ADQ CVX/VAG CYTO-IMP: NORMAL